# Patient Record
Sex: FEMALE | Race: WHITE | NOT HISPANIC OR LATINO | Employment: OTHER | URBAN - METROPOLITAN AREA
[De-identification: names, ages, dates, MRNs, and addresses within clinical notes are randomized per-mention and may not be internally consistent; named-entity substitution may affect disease eponyms.]

---

## 2021-04-08 DIAGNOSIS — Z23 ENCOUNTER FOR IMMUNIZATION: ICD-10-CM

## 2024-02-12 ENCOUNTER — APPOINTMENT (OUTPATIENT)
Dept: LAB | Facility: HOSPITAL | Age: 74
End: 2024-02-12
Payer: MEDICARE

## 2024-02-12 ENCOUNTER — HOSPITAL ENCOUNTER (OUTPATIENT)
Dept: CT IMAGING | Facility: HOSPITAL | Age: 74
Discharge: HOME | End: 2024-02-12
Payer: MEDICARE

## 2024-02-12 DIAGNOSIS — R10.32 LEFT LOWER QUADRANT PAIN: ICD-10-CM

## 2024-02-12 DIAGNOSIS — R10.32 ABDOMINAL PAIN, LEFT LOWER QUADRANT: ICD-10-CM

## 2024-02-12 LAB
ALBUMIN SERPL BCP-MCNC: 4.3 G/DL (ref 3.5–5)
ANION GAP SERPL CALCULATED.3IONS-SCNC: 9 MMOL/L
BUN SERPL-MCNC: 27 MG/DL (ref 5–25)
CALCIUM SERPL-MCNC: 10.1 MG/DL (ref 8.4–10.2)
CHLORIDE SERPL-SCNC: 101 MMOL/L (ref 96–108)
CO2 SERPL-SCNC: 26 MMOL/L (ref 21–32)
CREAT SERPL-MCNC: 1.63 MG/DL (ref 0.6–1.3)
GFR SERPL CREATININE-BSD FRML MDRD: 31 ML/MIN/1.73SQ M
GLUCOSE P FAST SERPL-MCNC: 235 MG/DL (ref 65–99)
PHOSPHATE SERPL-MCNC: 3 MG/DL (ref 2.3–4.1)
POTASSIUM SERPL-SCNC: 4.5 MMOL/L (ref 3.5–5.3)
SODIUM SERPL-SCNC: 136 MMOL/L (ref 135–147)

## 2024-02-12 PROCEDURE — 36415 COLL VENOUS BLD VENIPUNCTURE: CPT

## 2024-02-12 PROCEDURE — 74176 CT ABD & PELVIS W/O CONTRAST: CPT

## 2024-02-12 PROCEDURE — 80069 RENAL FUNCTION PANEL: CPT

## 2024-02-12 PROCEDURE — G1004 CDSM NDSC: HCPCS

## 2024-02-13 ENCOUNTER — TELEPHONE (OUTPATIENT)
Age: 74
End: 2024-02-13

## 2024-02-13 NOTE — TELEPHONE ENCOUNTER
Patient called to attempt to establish care for kidney stones.    Pt will call back to scheduled, will see if she can find offices that are closer to her home.

## 2024-02-13 NOTE — TELEPHONE ENCOUNTER
New Patient    What is the reason for the patient’s appointment?: NP- kidney stones     What office location does the patient prefer?: [place]     Does patient have Imaging/Lab Results:    CT done on 2/12/24    IMPRESSION:     Few proximal left ureteral calculi measuring up to 1.7 cm cause mild to moderate upstream hydroureteronephrosis.     Hepatic steatosis.     The study was marked in EPIC for immediate notification.        Have patient records been requested?:  If No, are the records showing in Epic: Records in epic       HISTORY:   Has the patient had any previous Urologist(s)?: No     Was the patient seen in the ED?: No     Please review patients CT results and call patient to schedule in appropriate time frame based off of those results.     CB: [phone]

## 2024-02-15 ENCOUNTER — OFFICE VISIT (OUTPATIENT)
Dept: UROLOGY | Facility: MEDICAL CENTER | Age: 74
End: 2024-02-15
Payer: MEDICARE

## 2024-02-15 VITALS
HEIGHT: 64 IN | WEIGHT: 228 LBS | SYSTOLIC BLOOD PRESSURE: 126 MMHG | HEART RATE: 101 BPM | OXYGEN SATURATION: 98 % | BODY MASS INDEX: 38.93 KG/M2 | DIASTOLIC BLOOD PRESSURE: 70 MMHG

## 2024-02-15 DIAGNOSIS — R73.9 ELEVATED BLOOD SUGAR: ICD-10-CM

## 2024-02-15 DIAGNOSIS — N20.1 CALCULUS OF URETER: Primary | ICD-10-CM

## 2024-02-15 DIAGNOSIS — N28.9 RENAL INSUFFICIENCY: ICD-10-CM

## 2024-02-15 PROCEDURE — 99204 OFFICE O/P NEW MOD 45 MIN: CPT | Performed by: UROLOGY

## 2024-02-15 RX ORDER — TAMSULOSIN HYDROCHLORIDE 0.4 MG/1
1 CAPSULE ORAL EVERY 24 HOURS
COMMUNITY
Start: 2024-02-12

## 2024-02-15 RX ORDER — LISINOPRIL AND HYDROCHLOROTHIAZIDE 25; 20 MG/1; MG/1
TABLET ORAL
COMMUNITY
Start: 2011-05-21

## 2024-02-15 RX ORDER — POLYETHYLENE GLYCOL 3350 17 G/17G
POWDER, FOR SOLUTION ORAL EVERY 24 HOURS
COMMUNITY
Start: 2024-02-12

## 2024-02-15 RX ORDER — LEVOTHYROXINE SODIUM 0.03 MG/1
TABLET ORAL
COMMUNITY

## 2024-02-15 RX ORDER — SIMVASTATIN 20 MG
TABLET ORAL
COMMUNITY
Start: 2011-05-09

## 2024-02-15 NOTE — PATIENT INSTRUCTIONS
On 12 February 2023, blood sugar was 235 at [facility],  She will be having surgery for kidney stones.  We cannot do this until we have much better blood sugar control to make the anesthesia safer

## 2024-02-15 NOTE — PROGRESS NOTES
"   HISTORY:    1.  Recent left lower quadrant anterior abdominal pain.    CT shows a 15 mm stone in the proximal left ureter, about 5 cm below UPJ.  The ureter appears quite tortuous.  There are also least 2 stones in the left kidney, largest is 15 mm.  Mild hydronephrosis.    Pain is improved since that visit, no symptoms now.    No infections or gross hematuria    In August 2021, at [facility] patient had CT done showing large ovarian cysts, and 1.4 cm in left ureter at L4.  She remembers undergoing surgery for the ovarian cyst.  Also members a consultation by urologist and being told \"I do not think you have stones\"    Another issue is possible diabetes.  She underwent chemistry profile in preparation for this recent CT.  Blood sugar 235 on 2/12/2024 at 1:45 PM, so was not fasting.  Creatinine 1.6         ASSESSMENT / PLAN:    1.  Obstructive uropathy with elevated creatinine from large ureteral stone.    2.  It is likely, but not impossible, that this  stone is chronic from a couple years ago.  She does not remember any renal colic back then,  or passing a large 14 mm stone.    3.  I told her it is very important to have her family doctor look into her elevated glucose, treat for diabetes if necessary.    There is a real concern by anesthesia to have better blood sugar control with general anesthesia.    4.  Schedule cystoscopy, left ureteroscopy, laser stone stent.  I told her we would attempt to get all the stones on left side treated, but could not guarantee that.    The following portions of the patient's history were reviewed and updated as appropriate: allergies, current medications, past family history, past medical history, past social history, past surgical history, and problem list.    Review of Systems      Objective:     Physical Exam      No results found for: \"PSA\"]  BUN   Date Value Ref Range Status   02/12/2024 27 (H) 5 - 25 mg/dL Final   03/31/2015 17 10 - 26 mg/dL Final     Creatinine " "  Date Value Ref Range Status   02/12/2024 1.63 (H) 0.60 - 1.30 mg/dL Final     Comment:     Standardized to IDMS reference method   03/31/2015 0.69 0.60 - 1.30 mg/dL Final     Comment:     Standardized to IDMS reference method     No components found for: \"CBC\"      There is no problem list on file for this patient.       Diagnoses and all orders for this visit:    Calculus of ureter  -     Stone risk profile    Other orders  -     simvastatin (ZOCOR) 20 mg tablet; TAKE 1 TABLET BY MOUTH EVERY DAY IN THE EVENING for 90  -     lisinopril-hydrochlorothiazide (PRINZIDE,ZESTORETIC) 20-25 MG per tablet; TAKE 1 TABLET BY MOUTH EVERY DAY for 90  -     levothyroxine 25 mcg tablet; take 1 tablet by mouth every day in the morning on empty stomach  -     polyethylene glycol (MiraLax) 17 GM/SCOOP powder; every 24 hours  -     Calcium Carbonate-Vit D-Min (Calcium 600 + Minerals) 600-200 MG-UNIT TABS; Every 12 hours  -     tamsulosin (FLOMAX) 0.4 mg; 1 capsule every 24 hours           Patient ID: Danni Layne is a 73 y.o. female.      Current Outpatient Medications:     Calcium Carbonate-Vit D-Min (Calcium 600 + Minerals) 600-200 MG-UNIT TABS, Every 12 hours, Disp: , Rfl:     levothyroxine 25 mcg tablet, take 1 tablet by mouth every day in the morning on empty stomach, Disp: , Rfl:     lisinopril-hydrochlorothiazide (PRINZIDE,ZESTORETIC) 20-25 MG per tablet, TAKE 1 TABLET BY MOUTH EVERY DAY for 90, Disp: , Rfl:     polyethylene glycol (MiraLax) 17 GM/SCOOP powder, every 24 hours, Disp: , Rfl:     simvastatin (ZOCOR) 20 mg tablet, TAKE 1 TABLET BY MOUTH EVERY DAY IN THE EVENING for 90, Disp: , Rfl:     tamsulosin (FLOMAX) 0.4 mg, 1 capsule every 24 hours, Disp: , Rfl:     Past Medical History:   Diagnosis Date    High cholesterol     Hypertension     Kidney stone        No past surgical history on file.    Social History                 "

## 2024-02-19 ENCOUNTER — TELEPHONE (OUTPATIENT)
Dept: UROLOGY | Facility: MEDICAL CENTER | Age: 74
End: 2024-02-19

## 2024-02-19 ENCOUNTER — PREP FOR PROCEDURE (OUTPATIENT)
Dept: UROLOGY | Facility: MEDICAL CENTER | Age: 74
End: 2024-02-19

## 2024-02-19 DIAGNOSIS — Z01.810 PRE-OPERATIVE CARDIOVASCULAR EXAMINATION: ICD-10-CM

## 2024-02-19 DIAGNOSIS — Z01.818 OTHER SPECIFIED PRE-OPERATIVE EXAMINATION: ICD-10-CM

## 2024-02-19 DIAGNOSIS — Z01.812 PRE-OPERATIVE LABORATORY EXAMINATION: ICD-10-CM

## 2024-02-19 DIAGNOSIS — N20.0 CALCULUS OF KIDNEY: Primary | ICD-10-CM

## 2024-02-19 DIAGNOSIS — R39.89 SUSPECTED UTI: ICD-10-CM

## 2024-02-19 NOTE — TELEPHONE ENCOUNTER
Tough case, large stones with mild renal insufficiency.   However, probably untreated diabetes.   Needs this procedure  within 2 to 4 weeks anybody.  I told patient it is very important to have family doctor work on the elevated blood sugar.     Emailed NANDO about case - waiting on reply - NANDO Ok'd case with NO CVAC.     Called and spoke with patient and [facility].     Verbally went over prep  -NPO  -Needs a ride to and from (outpatient)  -Hospital calls afternoon prior with arrival time  -Not on any major blood thinners     Updated PCP notes from today 2/19 in chart - I let her know I would fax a medical clearance form to office     Medical Clearance:  Called over to PCP office and spoke with Brii. She said patient has another upcoming visit on 2/26 and they can add pre-op visit to that as well. Asked that I fax MC Form to   Fax #: [phone]  I let her know the only labs we ordered was a UC and EKG and that patient will get those done prior (about 2 weeks)  Faxed 2/19    Mailed packet 2/19

## 2024-03-08 ENCOUNTER — APPOINTMENT (OUTPATIENT)
Dept: LAB | Facility: HOSPITAL | Age: 74
End: 2024-03-08
Payer: MEDICARE

## 2024-03-08 DIAGNOSIS — N20.0 CALCULUS OF KIDNEY: ICD-10-CM

## 2024-03-08 DIAGNOSIS — R39.89 SUSPECTED UTI: ICD-10-CM

## 2024-03-08 DIAGNOSIS — Z01.810 PRE-OPERATIVE CARDIOVASCULAR EXAMINATION: ICD-10-CM

## 2024-03-08 DIAGNOSIS — Z01.812 PRE-OPERATIVE LABORATORY EXAMINATION: ICD-10-CM

## 2024-03-08 PROCEDURE — 87086 URINE CULTURE/COLONY COUNT: CPT

## 2024-03-08 PROCEDURE — 93005 ELECTROCARDIOGRAM TRACING: CPT

## 2024-03-10 LAB — BACTERIA UR CULT: NORMAL

## 2024-03-14 LAB
ATRIAL RATE: 73 BPM
P AXIS: 35 DEGREES
PR INTERVAL: 142 MS
QRS AXIS: 33 DEGREES
QRSD INTERVAL: 92 MS
QT INTERVAL: 366 MS
QTC INTERVAL: 403 MS
T WAVE AXIS: 1 DEGREES
VENTRICULAR RATE: 73 BPM

## 2024-03-14 PROCEDURE — 93010 ELECTROCARDIOGRAM REPORT: CPT | Performed by: INTERNAL MEDICINE

## 2024-03-21 ENCOUNTER — HOSPITAL ENCOUNTER (OUTPATIENT)
Dept: CT IMAGING | Facility: HOSPITAL | Age: 74
Discharge: HOME | End: 2024-03-21
Payer: MEDICARE

## 2024-03-21 DIAGNOSIS — N20.1 CALCULUS OF URETER: Primary | ICD-10-CM

## 2024-03-21 DIAGNOSIS — N20.1 CALCULUS OF URETER: ICD-10-CM

## 2024-03-21 PROCEDURE — 74176 CT ABD & PELVIS W/O CONTRAST: CPT

## 2024-04-04 ENCOUNTER — APPOINTMENT (OUTPATIENT)
Dept: LAB | Facility: HOSPITAL | Age: 74
End: 2024-04-04
Payer: MEDICARE

## 2024-04-04 ENCOUNTER — TELEPHONE (OUTPATIENT)
Dept: UROLOGY | Facility: MEDICAL CENTER | Age: 74
End: 2024-04-04

## 2024-04-04 DIAGNOSIS — N20.1 CALCULUS OF URETER: ICD-10-CM

## 2024-04-04 DIAGNOSIS — N20.1 CALCULUS OF URETER: Primary | ICD-10-CM

## 2024-04-04 PROCEDURE — 82360 CALCULUS ASSAY QUANT: CPT

## 2024-04-09 LAB
COLOR STONE: NORMAL
COMMENT-STONE3: NORMAL
COMPOSITION: NORMAL
LABORATORY COMMENT REPORT: NORMAL
PHOTO: NORMAL
SIZE STONE: NORMAL MM
SPEC SOURCE SUBJ: NORMAL
STONE ANALYSIS-IMP: NORMAL
URATE MFR STONE: 100 %
WT STONE: 324 MG

## 2024-04-29 ENCOUNTER — HOSPITAL ENCOUNTER (OUTPATIENT)
Dept: ULTRASOUND IMAGING | Facility: HOSPITAL | Age: 74
Discharge: HOME | End: 2024-04-29
Payer: MEDICARE

## 2024-04-29 DIAGNOSIS — N20.1 CALCULUS OF URETER: ICD-10-CM

## 2024-04-29 PROCEDURE — 76775 US EXAM ABDO BACK WALL LIM: CPT

## 2024-05-23 ENCOUNTER — OFFICE VISIT (OUTPATIENT)
Dept: UROLOGY | Facility: MEDICAL CENTER | Age: 74
End: 2024-05-23
Payer: MEDICARE

## 2024-05-23 VITALS
SYSTOLIC BLOOD PRESSURE: 130 MMHG | RESPIRATION RATE: 16 BRPM | WEIGHT: 212 LBS | BODY MASS INDEX: 36.19 KG/M2 | HEART RATE: 86 BPM | OXYGEN SATURATION: 98 % | HEIGHT: 64 IN | DIASTOLIC BLOOD PRESSURE: 72 MMHG

## 2024-05-23 DIAGNOSIS — N18.31 CHRONIC KIDNEY DISEASE, STAGE 3A (HCC): ICD-10-CM

## 2024-05-23 DIAGNOSIS — E11.8 TYPE 2 DIABETES MELLITUS WITH UNSPECIFIED COMPLICATIONS (HCC): ICD-10-CM

## 2024-05-23 DIAGNOSIS — N20.1 CALCULUS OF URETER: Primary | ICD-10-CM

## 2024-05-23 DIAGNOSIS — E66.01 MORBID (SEVERE) OBESITY DUE TO EXCESS CALORIES (HCC): ICD-10-CM

## 2024-05-23 PROCEDURE — 99213 OFFICE O/P EST LOW 20 MIN: CPT

## 2024-05-23 RX ORDER — POLYETHYLENE GLYCOL 3350 17 G/17G
POWDER, FOR SOLUTION ORAL EVERY 24 HOURS
COMMUNITY
Start: 2024-02-12

## 2024-05-23 NOTE — LETTER
May 23, 2024     Tae Hilario MD  [address] [facility] [id number]    Patient: Danni Layne   YOB: 1950   Date of Visit: 5/23/2024       Dear Dr. Hilario:    Thank you for referring Danni Layne to me for evaluation. Below are my notes for this consultation.    If you have questions, please do not hesitate to call me. I look forward to following your patient along with you.         Sincerely,        Moises Travis PA-C        CC: No Recipients    Moises Travis PA-C  5/23/2024 12:03 PM  Incomplete  5/23/2024      Assessment and Plan    74 y.o. female managed by Dr. Mccormick     Calculus of ureter  Patient underwent CT of the abdomen and pelvis without contrast on 2/12/2024 and it was discussed that the scan showed a 15 mm stone in the proximal left ureter, about 5 cm of below the UPJ as well as 2 stones in the left kidney with the largest measuring 15 mm and noted mild hydronephrosis of the left kidney at that time  Patient called the office on 3/20/2024 noted that she passed 1 large stone on 3/19/2024.  A repeat CT renal stone study was performed on 3/21/2024 and visualized a 14 mm as well as a 16 mm calculi in the lower pole of the left kidney, and noted that the prior left ureteral calculus is no longer present.  However, the CT scan did note that there was still some hydroureteronephrosis, but that it did decrease from the previous scan on February 12, 2024  The patient then brought her stone that she passed on 3/19/2024 to the office and was sent out for stone analysis.  The stone was noted to be orange color and 100% uric acid.  Additionally, the patient underwent an ultrasound of the kidney and bladder on 4/29/2024 which we visualized a 1.1 cm renal pelvic calculus as well as additional scattered intrarenal calculi.  We discussed today that in light of her no longer having lower abdominal pain or flank pain, that the stones that are still visualized in the kidney at this time do not need to be  operated on.  We discussed that the 11 mm renal pelvic calculus may never passed into the ureter itself, but if it is not causing her pain then he can continue to be monitored.  However, we discussed that if it does start to cause her pain that if the possible for us to remove that stone surgically.  Additionally, I noted to the patient that scattered intrarenal calculi noted on the ultrasound may float around in the kidney forever and never truly passed into the ureter, but also have the chance to move into the ureter and cause similar flank and lower abdominal pain that she experienced previously.  I if this were to occur I advised her to reach out to her primary care provider or office and repeat imaging could be done to reevaluate the stones.  We discussed the patient's stone risk profile and the results noted the patient's stone composition to be 100% uric acid.  We discussed that we could prevent further uric acid stones with the addition of allopurinol which is used to lower uric acid levels as well as potassium citrate which is used to raise the pH of the urine to prevent stone formation.  At this time, the patient would prefer to undergo uric acid blood level testing to evaluate her risk for further uric acid stones prior to starting any medications for stone prevention.  The patient will undergo uric acid blood level testing within the next few weeks as well as have her primary care provider order a uric acid blood test with her annual blood work to use the 1 that I am ordering now as a baseline for her uric acid level.  Additionally, I printed out basic stone prevention information that the patient can use to modify her diet to further assist in preventing the formation of more kidney stones        History of Present Illness  Danni Layne is a 74 y.o. female here for evaluation of nephrolithiasis.  Patient was last seen in the office on 2/15/2024 and presented with left lower quadrant abdominal pain.   Patient had a CT of the abdomen and pelvis without contrast on 2/12/2024 which visualized a few proximal left ureteral calculi measuring up to 1.7 cm causing mild to moderate hydroureter nephrosis as well as multiple nonobstructing left renal calculi at that time.  There is discussed with her that the CT scan showed a 15 mm stone in the proximal left ureter, about 5 cm below the UPJ as well as at least 2 stones in the left kidney with the largest measuring 15 mm and mild hydronephrosis of the left kidney.  Patient then called the office on 3/20/2024 and noted that she passed 1 large stone on 3/19/2024.  A repeat CT renal stone study was performed on 3/21/2024 and visualized a 14 mm and 6 mm calculi in the lower pole of the left kidney.  However, I did note that the prior left ureteral calculus is no longer present and that the hydroureteronephrosis previously seen on February 12, 2024 had decreased.  Prior to her appointment today the patient underwent ultrasound of the kidney and bladder which visualized a 1.1 cm left renal pelvic calculus, but noted no hydronephrosis in the left kidney.  Additionally, the patient brought her stone that she passed on 3/19/2024 to the office which was sent out for stone analysis.  Stone was noted to be orange color and 100% uric acid at that time.  Today, patient reports that she has no longer having pain from her kidney stone and we have notes that she was no longer having pain from it by March.  We reviewed her recent CT scan done on 3/21/2024 as well as her ultrasound of her kidney and bladder on 4/29/2024 in the office today.  We discussed that at this time if she is no longer having pain then we do not need to surgically remove the remaining stone in her kidney, but that there is always the chance that they move into the ureter and cause similar pain to her prior episode.  Otherwise, the patient offers no other lower urinary tract complaints today.    Review of Systems  "  Constitutional:  Negative for chills and fever.   HENT:  Negative for ear pain and sore throat.    Eyes:  Negative for pain and visual disturbance.   Respiratory:  Negative for cough and shortness of breath.    Cardiovascular:  Negative for chest pain and palpitations.   Gastrointestinal:  Negative for abdominal distention, abdominal pain and vomiting.   Genitourinary:  Negative for difficulty urinating, dysuria, flank pain, frequency, hematuria and urgency.   Musculoskeletal:  Negative for arthralgias and back pain.   Skin:  Negative for color change and rash.   Neurological:  Negative for seizures and syncope.   All other systems reviewed and are negative.               Vitals  Vitals:    05/23/24 1058   BP: 130/72   BP Location: Left arm   Patient Position: Sitting   Cuff Size: Adult   Pulse: 86   Resp: 16   SpO2: 98%   Weight: 96.2 kg (212 lb)   Height: 5' 4\" (1.626 m)       Physical Exam  Vitals reviewed.   Constitutional:       General: She is not in acute distress.     Appearance: Normal appearance. She is not ill-appearing.   HENT:      Head: Normocephalic and atraumatic.      Nose: Nose normal.   Eyes:      General: No scleral icterus.  Pulmonary:      Effort: No respiratory distress.   Abdominal:      General: Abdomen is flat. There is no distension.      Palpations: Abdomen is soft.      Tenderness: There is no abdominal tenderness.   Skin:     General: Skin is warm.      Coloration: Skin is not jaundiced.   Neurological:      Mental Status: She is alert.      Gait: Gait normal.   Psychiatric:         Mood and Affect: Mood normal.         Behavior: Behavior normal.           Past History  Past Medical History:   Diagnosis Date   • Diabetes mellitus (HCC)     type 2   • Disease of thyroid gland     Hypothyroidism   • High cholesterol    • Hypertension    • Kidney stone      Social History     Socioeconomic History   • Marital status:      Spouse name: None   • Number of children: None   • Years " "of education: None   • Highest education level: None   Occupational History   • None   Tobacco Use   • Smoking status: Former     Types: Cigarettes   • Smokeless tobacco: Never   • Tobacco comments:     Former smoker - quit approx 25 yrs ago    Vaping Use   • Vaping status: Never Used   Substance and Sexual Activity   • Alcohol use: Yes     Comment: avg use 1-2 /week   • Drug use: Never     Comment: Denies any drug use per pt   • Sexual activity: Not Currently     Comment: Not active at this time per pt   Other Topics Concern   • None   Social History Narrative   • None     Social Determinants of Health     Financial Resource Strain: Not on file   Food Insecurity: Not on file   Transportation Needs: Not on file   Physical Activity: Not on file   Stress: Not on file   Social Connections: Not on file   Intimate Partner Violence: Not on file   Housing Stability: Not on file     Social History     Tobacco Use   Smoking Status Former   • Types: Cigarettes   Smokeless Tobacco Never   Tobacco Comments    Former smoker - quit approx 25 yrs ago      Family History   Problem Relation Age of Onset   • Anesthesia problems Neg Hx        The following portions of the patient's history were reviewed and updated as appropriate: allergies, current medications, past medical history, past social history, past surgical history and problem list.    Results  No results found for this or any previous visit (from the past 1 hour(s)).]  No results found for: \"PSA\"  Lab Results   Component Value Date    GLUCOSE 110 03/31/2015    CALCIUM 10.1 02/12/2024     (H) 03/31/2015    K 4.5 02/12/2024    CO2 26 02/12/2024     02/12/2024    BUN 27 (H) 02/12/2024    CREATININE 1.63 (H) 02/12/2024     Lab Results   Component Value Date    WBC 7.59 04/03/2015    HGB 8.7 (L) 04/03/2015    HCT 27.5 (L) 04/03/2015    MCV 93 04/03/2015     04/03/2015        Moises Travis PA-C  5/23/2024 11:35 AM  Sign when Signing " Visit  5/23/2024      Assessment and Plan    74 y.o. female managed by Dr. Mccormick     No problem-specific Assessment & Plan notes found for this encounter.        History of Present Illness  Danni Layne is a 74 y.o. female here for evaluation of nephrolithiasis.  Patient was last seen in the office on 2/15/2024 and presented with left lower quadrant abdominal pain.  Patient had a CT of the abdomen and pelvis without contrast on 2/12/2024 which visualized a few proximal left ureteral calculi measuring up to 1.7 cm causing mild to moderate hydroureter nephrosis as well as multiple nonobstructing left renal calculi at that time.  There is discussed with her that the CT scan showed a 15 mm stone in the proximal left ureter, about 5 cm below the UPJ as well as at least 2 stones in the left kidney with the largest measuring 15 mm and mild hydronephrosis of the left kidney.  Patient then called the office on 3/20/2024 and noted that she passed 1 large stone on 3/19/2024.  A repeat CT renal stone study was performed on 3/21/2024 and visualized a 14 mm and 6 mm calculi in the lower pole of the left kidney.  However, I did note that the prior left ureteral calculus is no longer present and that the hydroureteronephrosis previously seen on February 12, 2024 had decreased.  Prior to her appointment today the patient underwent ultrasound of the kidney and bladder which visualized a 1.1 cm left renal pelvic calculus, but noted no hydronephrosis in the left kidney.  Additionally, the patient brought her stone that she passed on 3/19/2024 to the office which was sent out for stone analysis.  Stone was noted to be orange color and 100% uric acid at that time.  Today, patient reports that she has no longer having pain from her kidney stone and we have notes that she was no longer having pain from it by March.  We reviewed her recent CT scan done on 3/21/2024 as well as her ultrasound of her kidney and bladder on 4/29/2024 in  the office today.  We discussed that at this time if she is no longer having pain then we do not need to surgically remove the remaining stone in her kidney, but that there is always the chance that they move into the ureter and cause similar pain to her prior episode.  Otherwise, the patient offers no other lower urinary tract complaints today.    Review of Systems             Vitals  There were no vitals filed for this visit.    Physical Exam      Past History  Past Medical History:   Diagnosis Date   • Diabetes mellitus (HCC)     type 2   • Disease of thyroid gland     Hypothyroidism   • High cholesterol    • Hypertension    • Kidney stone      Social History     Socioeconomic History   • Marital status:      Spouse name: Not on file   • Number of children: Not on file   • Years of education: Not on file   • Highest education level: Not on file   Occupational History   • Not on file   Tobacco Use   • Smoking status: Former     Types: Cigarettes   • Smokeless tobacco: Never   • Tobacco comments:     Former smoker - quit approx 25 yrs ago    Vaping Use   • Vaping status: Not on file   Substance and Sexual Activity   • Alcohol use: Yes     Comment: avg use 1-2 /week   • Drug use: Never     Comment: Denies any drug use per pt   • Sexual activity: Not Currently     Comment: Not active at this time per pt   Other Topics Concern   • Not on file   Social History Narrative   • Not on file     Social Determinants of Health     Financial Resource Strain: Not on file   Food Insecurity: Not on file   Transportation Needs: Not on file   Physical Activity: Not on file   Stress: Not on file   Social Connections: Not on file   Intimate Partner Violence: Not on file   Housing Stability: Not on file     Social History     Tobacco Use   Smoking Status Former   • Types: Cigarettes   Smokeless Tobacco Never   Tobacco Comments    Former smoker - quit approx 25 yrs ago      Family History   Problem Relation Age of Onset   •  "Anesthesia problems Neg Hx        The following portions of the patient's history were reviewed and updated as appropriate: allergies, current medications, past medical history, past social history, past surgical history and problem list.    Results  No results found for this or any previous visit (from the past 1 hour(s)).]  No results found for: \"PSA\"  Lab Results   Component Value Date    GLUCOSE 110 03/31/2015    CALCIUM 10.1 02/12/2024     (H) 03/31/2015    K 4.5 02/12/2024    CO2 26 02/12/2024     02/12/2024    BUN 27 (H) 02/12/2024    CREATININE 1.63 (H) 02/12/2024     Lab Results   Component Value Date    WBC 7.59 04/03/2015    HGB 8.7 (L) 04/03/2015    HCT 27.5 (L) 04/03/2015    MCV 93 04/03/2015     04/03/2015      "

## 2024-05-23 NOTE — PATIENT INSTRUCTIONS
Dietary Management of Kidney Stone Disease    The dietary recommendations for most people who make kidney stones (especially the most common calcium oxalate stones) are uncomplicated and are not too tedious or bland.  Most importantly, the following recommendations also promote better health for a variety of reasons.    FLUIDS:  The single most important change for the majority patients is the need to greatly increase fluid intake.  You should at least produce two liters (about two quarts) of urine each day.  Depending on the heat outdoors and your level of physical activity, this usually means consuming ten, 10 ounce glasses (100 ounces) of fluid per day.  Water is always a good choice, but other drinks including tea, coffee, soda, and juice are also allowed as long as no one beverage becomes the sole source of fluid.    CALCIUM:  There is excellent evidence that calcium should not be avoided, but instead moderated.  A range of 600 to 1,100 mg of calcium per day, especially consumed at meals is probably a reasonable target. (i.e. 2-3 dairy servings per day) This might include small servings of yogurt, milk or ice cream.  This amount helps avoid over-absorption of oxalate from the digestive tract and also allows for healthy bone maintenance.    SODIUM (SALT):  Too much salt in your diet (both from the shaker and in the prepared foods that we buy) is bad for your blood pressure, bad for your heart, and also increases the amount of calcium in your urine.  A reasonable sodium restriction to 2,000-2,500mg/day (about the amount in one teaspoon) is an excellent target.  You should get into the habit of reading the “Nutrients” labels on all the foods that you eat and watch out for the foods that have a high sodium content (snack foods, smoked or processed foods, caned foods).  Fresh and frozen foods usually have the least amount of sodium.    PROTEIN:  High protein diets from animal meat (beef, chicken, pork, fish) also  increases the rate of kidney stone formation and is equally unhealthy for your heart.  All patients should moderate their meat intake to 3-7 ounces per day, and particularly stay away from red meat protein.    OXALATE:  Most stone-formers should avoid heavy intake of oxalate-rich foods.  These include green roughage (spinach, mustard, kale), strawberries, chocolate, tea, iced tea, and nuts.  In addition, heavy, excess doses of Vitamin C can also produce surges in urinary oxalate levels and should be avoided.    BARE-BONES RECOMMENDATIONS:  Fluids, fluids, fluids.    Low salt diet (your primary care doctor will love you).  Moderate calcium (dairy products), especially with meals.  Moderate red meat intake.

## 2024-05-23 NOTE — PROGRESS NOTES
5/23/2024      Assessment and Plan    74 y.o. female managed by Dr. Mccormick     Calculus of ureter  Patient underwent CT of the abdomen and pelvis without contrast on 2/12/2024 and it was discussed that the scan showed a 15 mm stone in the proximal left ureter, about 5 cm of below the UPJ as well as 2 stones in the left kidney with the largest measuring 15 mm and noted mild hydronephrosis of the left kidney at that time  Patient called the office on 3/20/2024 noted that she passed 1 large stone on 3/19/2024.  A repeat CT renal stone study was performed on 3/21/2024 and visualized a 14 mm as well as a 16 mm calculi in the lower pole of the left kidney, and noted that the prior left ureteral calculus is no longer present.  However, the CT scan did note that there was still some hydroureteronephrosis, but that it did decrease from the previous scan on February 12, 2024  The patient then brought her stone that she passed on 3/19/2024 to the office and was sent out for stone analysis.  The stone was noted to be orange color and 100% uric acid.  Additionally, the patient underwent an ultrasound of the kidney and bladder on 4/29/2024 which we visualized a 1.1 cm renal pelvic calculus as well as additional scattered intrarenal calculi.  We discussed today that in light of her no longer having lower abdominal pain or flank pain, that the stones that are still visualized in the kidney at this time do not need to be operated on.  We discussed that the 11 mm renal pelvic calculus may never passed into the ureter itself, but if it is not causing her pain then he can continue to be monitored.  However, we discussed that if it does start to cause her pain that it is possible for us to remove that stone surgically.  Additionally, I noted to the patient that scattered intrarenal calculi noted on the ultrasound may float around in the kidney forever and never truly passed into the ureter, but also have the chance to move into the  ureter and cause similar flank and lower abdominal pain that she experienced previously.  If this were to occur, I advised her to reach out to her primary care provider or office and repeat imaging could be done to reevaluate the stones.  We discussed the patient's stone risk profile and the results noted the patient's stone composition to be 100% uric acid.  We discussed that we could prevent further uric acid stones with the addition of allopurinol which is used to lower uric acid levels as well as potassium citrate which is used to raise the pH of the urine to prevent stone formation.  At this time, the patient would prefer to undergo uric acid blood level testing to evaluate her risk for further uric acid stones prior to starting any medications for stone prevention.  The patient will undergo uric acid blood level testing within the next few weeks as well as have her primary care provider order a uric acid blood test with her annual blood work to use the 1 that I am ordering now as a baseline for her uric acid level.  Additionally, I printed out basic stone prevention information that the patient can use to modify her diet to further assist in preventing the formation of more kidney stones        History of Present Illness  Danni Layne is a 74 y.o. female here for evaluation of nephrolithiasis.  Patient was last seen in the office on 2/15/2024 and presented with left lower quadrant abdominal pain.  Patient had a CT of the abdomen and pelvis without contrast on 2/12/2024 which visualized a few proximal left ureteral calculi measuring up to 1.7 cm causing mild to moderate hydroureter nephrosis as well as multiple nonobstructing left renal calculi at that time.  There is discussed with her that the CT scan showed a 15 mm stone in the proximal left ureter, about 5 cm below the UPJ as well as at least 2 stones in the left kidney with the largest measuring 15 mm and mild hydronephrosis of the left kidney.   Patient then called the office on 3/20/2024 and noted that she passed 1 large stone on 3/19/2024.  A repeat CT renal stone study was performed on 3/21/2024 and visualized a 14 mm and 6 mm calculi in the lower pole of the left kidney.  However, I did note that the prior left ureteral calculus is no longer present and that the hydroureteronephrosis previously seen on February 12, 2024 had decreased.  Prior to her appointment today the patient underwent ultrasound of the kidney and bladder which visualized a 1.1 cm left renal pelvic calculus, but noted no hydronephrosis in the left kidney.  Additionally, the patient brought her stone that she passed on 3/19/2024 to the office which was sent out for stone analysis.  Stone was noted to be orange color and 100% uric acid at that time.  Today, patient reports that she has no longer having pain from her kidney stone and we have notes that she was no longer having pain from it by March.  We reviewed her recent CT scan done on 3/21/2024 as well as her ultrasound of her kidney and bladder on 4/29/2024 in the office today.  We discussed that at this time if she is no longer having pain then we do not need to surgically remove the remaining stone in her kidney, but that there is always the chance that they move into the ureter and cause similar pain to her prior episode.  Otherwise, the patient offers no other lower urinary tract complaints today.    Review of Systems   Constitutional:  Negative for chills and fever.   HENT:  Negative for ear pain and sore throat.    Eyes:  Negative for pain and visual disturbance.   Respiratory:  Negative for cough and shortness of breath.    Cardiovascular:  Negative for chest pain and palpitations.   Gastrointestinal:  Negative for abdominal distention, abdominal pain and vomiting.   Genitourinary:  Negative for difficulty urinating, dysuria, flank pain, frequency, hematuria and urgency.   Musculoskeletal:  Negative for arthralgias and back  "pain.   Skin:  Negative for color change and rash.   Neurological:  Negative for seizures and syncope.   All other systems reviewed and are negative.               Vitals  Vitals:    05/23/24 1058   BP: 130/72   BP Location: Left arm   Patient Position: Sitting   Cuff Size: Adult   Pulse: 86   Resp: 16   SpO2: 98%   Weight: 96.2 kg (212 lb)   Height: 5' 4\" (1.626 m)       Physical Exam  Vitals reviewed.   Constitutional:       General: She is not in acute distress.     Appearance: Normal appearance. She is not ill-appearing.   HENT:      Head: Normocephalic and atraumatic.      Nose: Nose normal.   Eyes:      General: No scleral icterus.  Pulmonary:      Effort: No respiratory distress.   Abdominal:      General: Abdomen is flat. There is no distension.      Palpations: Abdomen is soft.      Tenderness: There is no abdominal tenderness.   Skin:     General: Skin is warm.      Coloration: Skin is not jaundiced.   Neurological:      Mental Status: She is alert.      Gait: Gait normal.   Psychiatric:         Mood and Affect: Mood normal.         Behavior: Behavior normal.           Past History  Past Medical History:   Diagnosis Date    Diabetes mellitus (HCC)     type 2    Disease of thyroid gland     Hypothyroidism    High cholesterol     Hypertension     Kidney stone      Social History     Socioeconomic History    Marital status:      Spouse name: None    Number of children: None    Years of education: None    Highest education level: None   Occupational History    None   Tobacco Use    Smoking status: Former     Types: Cigarettes    Smokeless tobacco: Never    Tobacco comments:     Former smoker - quit approx 25 yrs ago    Vaping Use    Vaping status: Never Used   Substance and Sexual Activity    Alcohol use: Yes     Comment: avg use 1-2 /week    Drug use: Never     Comment: Denies any drug use per pt    Sexual activity: Not Currently     Comment: Not active at this time per pt   Other Topics Concern    " "None   Social History Narrative    None     Social Determinants of Health     Financial Resource Strain: Not on file   Food Insecurity: Not on file   Transportation Needs: Not on file   Physical Activity: Not on file   Stress: Not on file   Social Connections: Not on file   Intimate Partner Violence: Not on file   Housing Stability: Not on file     Social History     Tobacco Use   Smoking Status Former    Types: Cigarettes   Smokeless Tobacco Never   Tobacco Comments    Former smoker - quit approx 25 yrs ago      Family History   Problem Relation Age of Onset    Anesthesia problems Neg Hx        The following portions of the patient's history were reviewed and updated as appropriate: allergies, current medications, past medical history, past social history, past surgical history and problem list.    Results  No results found for this or any previous visit (from the past 1 hour(s)).]  No results found for: \"PSA\"  Lab Results   Component Value Date    GLUCOSE 110 03/31/2015    CALCIUM 10.1 02/12/2024     (H) 03/31/2015    K 4.5 02/12/2024    CO2 26 02/12/2024     02/12/2024    BUN 27 (H) 02/12/2024    CREATININE 1.63 (H) 02/12/2024     Lab Results   Component Value Date    WBC 7.59 04/03/2015    HGB 8.7 (L) 04/03/2015    HCT 27.5 (L) 04/03/2015    MCV 93 04/03/2015     04/03/2015      "

## 2024-05-23 NOTE — ASSESSMENT & PLAN NOTE
Patient underwent CT of the abdomen and pelvis without contrast on 2/12/2024 and it was discussed that the scan showed a 15 mm stone in the proximal left ureter, about 5 cm of below the UPJ as well as 2 stones in the left kidney with the largest measuring 15 mm and noted mild hydronephrosis of the left kidney at that time  Patient called the office on 3/20/2024 noted that she passed 1 large stone on 3/19/2024.  A repeat CT renal stone study was performed on 3/21/2024 and visualized a 14 mm as well as a 16 mm calculi in the lower pole of the left kidney, and noted that the prior left ureteral calculus is no longer present.  However, the CT scan did note that there was still some hydroureteronephrosis, but that it did decrease from the previous scan on February 12, 2024  The patient then brought her stone that she passed on 3/19/2024 to the office and was sent out for stone analysis.  The stone was noted to be orange color and 100% uric acid.  Additionally, the patient underwent an ultrasound of the kidney and bladder on 4/29/2024 which we visualized a 1.1 cm renal pelvic calculus as well as additional scattered intrarenal calculi.  We discussed today that in light of her no longer having lower abdominal pain or flank pain, that the stones that are still visualized in the kidney at this time do not need to be operated on.  We discussed that the 11 mm renal pelvic calculus may never passed into the ureter itself, but if it is not causing her pain then he can continue to be monitored.  However, we discussed that if it does start to cause her pain that it is possible for us to remove that stone surgically.  Additionally, I noted to the patient that scattered intrarenal calculi noted on the ultrasound may float around in the kidney forever and never truly passed into the ureter, but also have the chance to move into the ureter and cause similar flank and lower abdominal pain that she experienced previously.  If this were  to occur, I advised her to reach out to her primary care provider or office and repeat imaging could be done to reevaluate the stones.  We discussed the patient's stone risk profile and the results noted the patient's stone composition to be 100% uric acid.  We discussed that we could prevent further uric acid stones with the addition of allopurinol which is used to lower uric acid levels as well as potassium citrate which is used to raise the pH of the urine to prevent stone formation.  At this time, the patient would prefer to undergo uric acid blood level testing to evaluate her risk for further uric acid stones prior to starting any medications for stone prevention.  The patient will undergo uric acid blood level testing within the next few weeks as well as have her primary care provider order a uric acid blood test with her annual blood work to use the 1 that I am ordering now as a baseline for her uric acid level.  Additionally, I printed out basic stone prevention information that the patient can use to modify her diet to further assist in preventing the formation of more kidney stones

## 2024-06-04 ENCOUNTER — APPOINTMENT (OUTPATIENT)
Dept: LAB | Facility: HOSPITAL | Age: 74
End: 2024-06-04
Payer: MEDICARE

## 2024-06-04 DIAGNOSIS — N20.1 CALCULUS OF URETER: ICD-10-CM

## 2024-06-04 LAB — URATE SERPL-MCNC: 8.7 MG/DL (ref 2–7.5)

## 2024-06-04 PROCEDURE — 36415 COLL VENOUS BLD VENIPUNCTURE: CPT

## 2024-06-04 PROCEDURE — 84550 ASSAY OF BLOOD/URIC ACID: CPT
